# Patient Record
Sex: FEMALE | Race: WHITE | NOT HISPANIC OR LATINO | Employment: FULL TIME | URBAN - METROPOLITAN AREA
[De-identification: names, ages, dates, MRNs, and addresses within clinical notes are randomized per-mention and may not be internally consistent; named-entity substitution may affect disease eponyms.]

---
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
## 2025-02-20 ENCOUNTER — OFFICE VISIT (OUTPATIENT)
Dept: OTOLARYNGOLOGY | Facility: CLINIC | Age: 20
End: 2025-02-20
Payer: COMMERCIAL

## 2025-02-20 VITALS — WEIGHT: 187.63 LBS | BODY MASS INDEX: 32.2 KG/M2

## 2025-02-20 DIAGNOSIS — H61.22 IMPACTED CERUMEN OF LEFT EAR: ICD-10-CM

## 2025-02-20 DIAGNOSIS — H92.12 PURULENT OTORRHEA OF LEFT EAR: Primary | ICD-10-CM

## 2025-02-20 DIAGNOSIS — H69.93 DYSFUNCTION OF BOTH EUSTACHIAN TUBES: ICD-10-CM

## 2025-02-20 NOTE — PROGRESS NOTES
Subjective     Patient ID: Radha Vragas is a 19 y.o. female.    Chief Complaint: Otitis Media    HPI      Radha Vargas is a 19 y.o. female with a 2 week history of left ear drainage following recent URI . The drainage is yellow green in color. The drainage is not bloody. The patient last underwent bilateral  PE Tube insertion 6 years ago on 2019 . The patient has had a tube inserted in the R ear  1 time/times and a tube inserted in the L ear 1 time/times.  The patient has not had an adenoidectomy.  The patient has not had a tonsillectomy. The tubes are still in as per the caregiver.     She underwent a right tympanoplasty and left myringoplasty on 1/22/14.     She underwent bilateral fat graft myringoplasties on 6/27/18 for 2 mm perforations bilaterally. Because of recurrent otalgia due to eustachian tube dysfunction, tubes were replaced in 2019.       The patient does not have a TM perforation on the affected side  The patient does not wet the ears during bathing. The patient is not a swimmer. The drainage is associated with no fever, fullness, hearing loss. The patient's symptoms are described as mild. The patient has been treated with the following ear drops :could not recall specific name.  The patient has been treated with the following antibiotics : could  not recall . The patient has improved since the onset of the problem       Review of Systems   Constitutional:  Negative for chills, fever and unexpected weight change.   HENT:  Positive for ear discharge and postnasal drip. Negative for facial swelling, hearing loss and trouble swallowing.    Eyes:  Negative for visual disturbance.   Respiratory:  Negative for wheezing and stridor.    Cardiovascular:  Negative for chest pain.        No CHD   Gastrointestinal:  Negative for nausea and vomiting.        Neg for GERD   Genitourinary:  Negative for enuresis.        Neg for congenital abn   Musculoskeletal: Negative.  Negative for arthralgias and myalgias.    Integumentary:  Negative for rash.   Neurological:  Negative for seizures and speech difficulty.   Hematological:  Negative for adenopathy. Does not bruise/bleed easily.   Psychiatric/Behavioral:  Negative for behavioral problems.           Objective     Physical Exam  Constitutional:       General: She is not in acute distress.     Appearance: She is well-developed.   HENT:      Head: Normocephalic.      Right Ear: Tympanic membrane, ear canal and external ear normal. No decreased hearing noted. No swelling. No middle ear effusion. There is no impacted cerumen. A PE tube is present.      Left Ear: Tympanic membrane, ear canal and external ear normal. Decreased hearing noted. Drainage and swelling present.  No middle ear effusion. There is impacted cerumen. A PE tube is present.      Nose: Nose normal. No nasal deformity.   Eyes:      General: Lids are normal.      Conjunctiva/sclera: Conjunctivae normal.      Pupils: Pupils are equal, round, and reactive to light.   Neck:      Thyroid: No thyroid mass.      Trachea: Trachea normal.   Cardiovascular:      Rate and Rhythm: Normal rate and regular rhythm.   Pulmonary:      Effort: Pulmonary effort is normal. No respiratory distress.      Breath sounds: Normal breath sounds.   Musculoskeletal:         General: Normal range of motion.      Cervical back: Normal range of motion.   Lymphadenopathy:      Cervical: No cervical adenopathy.   Skin:     General: Skin is warm.      Findings: No rash.   Neurological:      Mental Status: She is alert and oriented to person, place, and time.      Cranial Nerves: No cranial nerve deficit.   Psychiatric:         Speech: Speech normal.         Behavior: Behavior normal.         Thought Content: Thought content normal.       Cerumen removal: Ears cleared under microscopic vision with curette, forceps and suction as necessary. Child appropriately restrained by parent or/and papoose board.       Assessment and Plan     1. Purulent  otorrhea of left ear    2. Dysfunction of both eustachian tubes- s/p BMT    3. Impacted cerumen of left ear        PLAN:  Cdex and augment bid x 10days  Recheck ear in 2 weeks  Tube check q 6months         No follow-ups on file.

## 2025-02-21 ENCOUNTER — TELEPHONE (OUTPATIENT)
Dept: OTOLARYNGOLOGY | Facility: CLINIC | Age: 20
End: 2025-02-21
Payer: COMMERCIAL

## 2025-02-21 DIAGNOSIS — H66.90 OTITIS MEDIA, UNSPECIFIED LATERALITY, UNSPECIFIED OTITIS MEDIA TYPE: Primary | ICD-10-CM

## 2025-02-21 PROBLEM — R76.8 POSITIVE ANA (ANTINUCLEAR ANTIBODY): Status: ACTIVE | Noted: 2022-11-30

## 2025-02-21 PROBLEM — R23.3 PETECHIAE: Status: ACTIVE | Noted: 2022-11-30

## 2025-02-21 RX ORDER — AMOXICILLIN AND CLAVULANATE POTASSIUM 875; 125 MG/1; MG/1
1 TABLET, FILM COATED ORAL 2 TIMES DAILY
Qty: 20 TABLET | Refills: 0 | Status: SHIPPED | OUTPATIENT
Start: 2025-02-21 | End: 2025-03-03

## 2025-02-21 RX ORDER — CIPROFLOXACIN AND DEXAMETHASONE 3; 1 MG/ML; MG/ML
4 SUSPENSION/ DROPS AURICULAR (OTIC) 2 TIMES DAILY
Qty: 7.5 ML | Refills: 0 | Status: SHIPPED | OUTPATIENT
Start: 2025-02-21

## 2025-02-21 NOTE — TELEPHONE ENCOUNTER
Spoke with the patient and let her know that the medications are now sent to the preferred pharmacy.

## 2025-03-21 ENCOUNTER — TELEPHONE (OUTPATIENT)
Dept: INFECTIOUS DISEASES | Facility: CLINIC | Age: 20
End: 2025-03-21
Payer: COMMERCIAL

## 2025-03-21 NOTE — TELEPHONE ENCOUNTER
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

## 2025-03-28 ENCOUNTER — TELEPHONE (OUTPATIENT)
Dept: INFECTIOUS DISEASES | Facility: CLINIC | Age: 20
End: 2025-03-28
Payer: COMMERCIAL

## 2025-03-28 NOTE — TELEPHONE ENCOUNTER
----- Message from Gena sent at 3/28/2025 12:31 PM CDT -----  Regarding: Appt Access  Contact: [phone]  Patient calling to schedule appt to establish care for treatment of MYCOPLASMA GENITALIUM. Patient was diagnosed July 2024 by Dr. Sullivan [facility].Pls call [phone]

## (undated) DEVICE — PACK MYRINGOTOMY CUSTOM

## (undated) DEVICE — SYR 10CC LUER LOCK

## (undated) DEVICE — CUP MEDICINE STERILE 2OZ

## (undated) DEVICE — SEE MEDLINE ITEM 152622

## (undated) DEVICE — PAD CUSTOM COTTON 2 X 2

## (undated) DEVICE — BLADE BEVELED GUARISCO

## (undated) DEVICE — SUCTION SURGICAL STR 7FR